# Patient Record
Sex: MALE | Race: WHITE | NOT HISPANIC OR LATINO | Employment: FULL TIME | URBAN - METROPOLITAN AREA
[De-identification: names, ages, dates, MRNs, and addresses within clinical notes are randomized per-mention and may not be internally consistent; named-entity substitution may affect disease eponyms.]

---

## 2022-07-12 ENCOUNTER — MEDICAL CORRESPONDENCE (OUTPATIENT)
Dept: HEALTH INFORMATION MANAGEMENT | Facility: CLINIC | Age: 67
End: 2022-07-12

## 2022-07-12 DIAGNOSIS — Z82.49 FAMILY HISTORY OF DISEASE OF AORTA: ICD-10-CM

## 2022-07-12 DIAGNOSIS — I71.9 AORTIC ANEURYSM (H): Primary | ICD-10-CM

## 2023-05-26 ENCOUNTER — HOSPITAL ENCOUNTER (OUTPATIENT)
Dept: CARDIOLOGY | Facility: CLINIC | Age: 68
Discharge: HOME | End: 2023-05-26
Attending: FAMILY MEDICINE | Admitting: FAMILY MEDICINE
Payer: COMMERCIAL

## 2023-05-26 DIAGNOSIS — Z82.49 FAMILY HISTORY OF DISEASE OF AORTA: ICD-10-CM

## 2023-05-26 DIAGNOSIS — I71.9 AORTIC ANEURYSM (H): Primary | ICD-10-CM

## 2023-05-26 LAB — LVEF ECHO: NORMAL

## 2023-05-26 PROCEDURE — 93306 TTE W/DOPPLER COMPLETE: CPT | Mod: 26 | Performed by: INTERNAL MEDICINE

## 2023-05-26 PROCEDURE — 999N000208 ECHOCARDIOGRAM COMPLETE

## 2023-05-26 PROCEDURE — 255N000002 HC RX 255 OP 636: Performed by: FAMILY MEDICINE

## 2023-05-26 RX ADMIN — HUMAN ALBUMIN MICROSPHERES AND PERFLUTREN 3 ML: 10; .22 INJECTION, SOLUTION INTRAVENOUS at 08:37

## 2024-05-24 ENCOUNTER — HOSPITAL ENCOUNTER (EMERGENCY)
Facility: CLINIC | Age: 69
Discharge: HOME | End: 2024-05-24
Attending: EMERGENCY MEDICINE | Admitting: EMERGENCY MEDICINE
Payer: COMMERCIAL

## 2024-05-24 ENCOUNTER — APPOINTMENT (OUTPATIENT)
Dept: GENERAL RADIOLOGY | Facility: CLINIC | Age: 69
End: 2024-05-24
Attending: EMERGENCY MEDICINE
Payer: COMMERCIAL

## 2024-05-24 VITALS
DIASTOLIC BLOOD PRESSURE: 72 MMHG | TEMPERATURE: 98.7 F | HEART RATE: 86 BPM | SYSTOLIC BLOOD PRESSURE: 132 MMHG | RESPIRATION RATE: 16 BRPM | OXYGEN SATURATION: 97 % | WEIGHT: 220 LBS

## 2024-05-24 DIAGNOSIS — M25.562 ACUTE PAIN OF LEFT KNEE: ICD-10-CM

## 2024-05-24 DIAGNOSIS — R55 VASOVAGAL NEAR SYNCOPE: ICD-10-CM

## 2024-05-24 PROCEDURE — 250N000013 HC RX MED GY IP 250 OP 250 PS 637

## 2024-05-24 PROCEDURE — 73562 X-RAY EXAM OF KNEE 3: CPT | Mod: LT

## 2024-05-24 PROCEDURE — 99284 EMERGENCY DEPT VISIT MOD MDM: CPT | Mod: 25

## 2024-05-24 PROCEDURE — 29505 APPLICATION LONG LEG SPLINT: CPT | Mod: LT

## 2024-05-24 RX ORDER — ACETAMINOPHEN 325 MG/1
975 TABLET ORAL ONCE
Status: COMPLETED | OUTPATIENT
Start: 2024-05-24 | End: 2024-05-24

## 2024-05-24 RX ORDER — IBUPROFEN 600 MG/1
600 TABLET, FILM COATED ORAL ONCE
Status: COMPLETED | OUTPATIENT
Start: 2024-05-24 | End: 2024-05-24

## 2024-05-24 RX ADMIN — IBUPROFEN 600 MG: 600 TABLET ORAL at 15:17

## 2024-05-24 RX ADMIN — ACETAMINOPHEN 975 MG: 325 TABLET, FILM COATED ORAL at 15:17

## 2024-05-24 ASSESSMENT — COLUMBIA-SUICIDE SEVERITY RATING SCALE - C-SSRS
6. HAVE YOU EVER DONE ANYTHING, STARTED TO DO ANYTHING, OR PREPARED TO DO ANYTHING TO END YOUR LIFE?: NO
1. IN THE PAST MONTH, HAVE YOU WISHED YOU WERE DEAD OR WISHED YOU COULD GO TO SLEEP AND NOT WAKE UP?: NO
2. HAVE YOU ACTUALLY HAD ANY THOUGHTS OF KILLING YOURSELF IN THE PAST MONTH?: NO

## 2024-05-24 ASSESSMENT — ACTIVITIES OF DAILY LIVING (ADL)
ADLS_ACUITY_SCORE: 35

## 2024-05-24 NOTE — ED TRIAGE NOTES
Hurt knee 2 nights ago, went to [facility] today to be seen and got faint. [facility] sent pt here for further eval.     Triage Assessment (Adult)       Row Name 05/24/24 4620          Triage Assessment    Airway WDL WDL        Respiratory WDL    Respiratory WDL WDL        Skin Circulation/Temperature WDL    Skin Circulation/Temperature WDL X  Left knee swelling in pain        Cardiac WDL    Cardiac WDL WDL        Peripheral/Neurovascular WDL    Peripheral Neurovascular WDL WDL        Cognitive/Neuro/Behavioral WDL    Cognitive/Neuro/Behavioral WDL WDL

## 2024-05-24 NOTE — ED PROVIDER NOTES
"  Emergency Department Note      History of Present Illness     Chief Complaint  Knee Pain    HPI  Sharan Munoz is a 68 year old male who presents to the ED today for evaluation of left knee pain.  2 days ago the patient was laying in bed when he flexed his left knee.  He immediately felt significant amount of pain after this.  He was able to ambulate the next morning but with some difficulty.  Reports that his knee feels \"wobbly\".  He has been taking Tylenol and ibuprofen for the pain.  At its worst the pain is rated a 9/10 in severity.  Today he had more difficulty ambulating.  Went to [facility] orthopedics, while there he had a syncopal episode.  States that he started to feel dizzy after trying to move his left leg.  Reports that there is a significant amount of pain which made it feel like he was going to \"pass out\". Denies past injury to his knee. No fevers, chills, headache. No chest pain, shortness of breath, or palpitations prior to feeling as if he was going to pass out.     Independent Historian  None    Review of External Notes  None  Past Medical History   Medical History and Problem List  Past Medical History:   Diagnosis Date    Gastro-oesophageal reflux disease     Hyperlipidaemia        Medications  HYDROcodone-acetaminophen (NORCO) 5-325 MG per tablet      Surgical History   Past Surgical History:   Procedure Laterality Date    ENT SURGERY      tonsils    HERNIORRHAPHY UMBILICAL  1/6/2014    Procedure: HERNIORRHAPHY UMBILICAL;  OPEN UMBILICAL HERNIA REPAIR WITH MESH ;  Surgeon: Daniel Rodgers MD;  Location: [facility]     Physical Exam   Patient Vitals for the past 24 hrs:   BP Temp Temp src Pulse Resp SpO2 Weight   05/24/24 1335 132/72 98.7  F (37.1  C) Temporal 86 16 97 % 99.8 kg (220 lb)     Physical Exam  General: Alert and oriented.    Head: Normocephalic.  External ears and nose normal.    Eyes: Pupils equal and round.  Normal tracking.    Pulmonary: Breath sounds clear. No wheezes, " rhonchi, or rales. Effort and rate normal.     Cardiovascular: Regular rate and rhythm. No murmurs, rubs or gallops.     MSK:    Significant pain with knee flexion and full extension. Moderate pain to palpation of quadriceps tendon and across the joint line. No pain to palpation of patellar tendon. No gross ligamentous laxity to varus/valgus stress. Unable to perform Lachman's or posterior drawer due to pain.  Normal movement at the hip and ankle.  Compartments soft, no pain w/passive ROM of ankle or great toe. Sensation to light touch intact. No erythema or warmth.     SKIN:  Warm and dry with strong DP, PT pulse and normal capillary refill. No rashes or crepitance.    NEURO:  Normal sensation throughout the foot and ankle. Normal strength of plantar and dorsiflexion in ankle and toes.    PSYCH:  Normal affect    Diagnostics   Lab Results   Labs Ordered and Resulted from Time of ED Arrival to Time of ED Departure - No data to display    Imaging  XR Knee Left 3 Views   Final Result   IMPRESSION: Mild medial and lateral compartment osteoarthrosis.   Moderate to severe patellofemoral compartment osteoarthrosis. Mild   lateral subluxation and tilt of the patella. Moderate effusion. No   evidence of fracture or calcified intra-articular body.      LIVE MCARTHUR MD            SYSTEM ID:  [id number]        Independent Interpretation  None  ED Course    Medications Administered  Medications   ibuprofen (ADVIL/MOTRIN) tablet 600 mg (600 mg Oral $Given 5/24/24 1517)   acetaminophen (TYLENOL) tablet 975 mg (975 mg Oral $Given 5/24/24 1517)       Procedures  Procedures     Discussion of Management  None    Social Determinants of Health adding to complexity of care  None    ED Course     Medical Decision Making / Diagnosis   CMS Diagnoses: None    MIPS     None    [facility]  Sharan Munoz is a 68 year old male who presented to the ED for evaluation of left knee pain for the past 2 days after flexing it while laying in bed  along with a presyncopal episode today. See HPI for further details. Patient's near syncope occurred after moving his painful leg while standing consistent with vasovagal syncope. History is not consistent with aortic stenosis, tachyarrhythmia, ACS, or stroke as the cause of near syncope. Patient was back to baseline minutes after the episode and had no recurrence here in the ED.    The patient's knee had a significant amount of swelling on exam. Physical exam is limited due to pain. XR of the left knee with evidence of arthritic changes, no acute fracture. Revealed moderate subluxation of the patella with an effusion. Suspect the patient's cause of knee pain and instability may be due to a ligamentous injury. Patient request tylenol and ibuprofen for his pain which was given. Instructed the patient to follow up with [facility] for further management and workup of his knee pain. The patient was placed in a knee immobilizer and given crutches. Instructed to use ibuprofen and tylenol for pain control at home. Patient is discharged home in stable condition.  Return precautions discussed. All questions answered.     Disposition  The patient was discharged.     ICD-10 Codes:    ICD-10-CM    1. Acute pain of left knee  M25.562       2. Vasovagal near syncope  R55          Discharge Medications  Discharge Medication List as of 5/24/2024  3:52 PM        Joana Oneill PA-C

## 2024-05-24 NOTE — DISCHARGE INSTRUCTIONS
Please follow-up with [facility] orthopedics as soon as possible, they will be able to diagnose and treat the cause of your left knee pain.  Please return to the emergency department if you have chest pain, shortness of breath, dizziness, fainting.

## 2024-08-14 ENCOUNTER — LAB REQUISITION (OUTPATIENT)
Dept: LAB | Facility: CLINIC | Age: 69
End: 2024-08-14
Payer: COMMERCIAL

## 2024-08-14 DIAGNOSIS — M25.562 PAIN IN LEFT KNEE: ICD-10-CM

## 2024-08-14 LAB
APPEARANCE FLD: ABNORMAL
BACTERIA SPEC CULT: NORMAL
CELL COUNT BODY FLUID SOURCE: ABNORMAL
COLOR FLD: ABNORMAL
CRYSTALS SNV MICRO: ABNORMAL
GRAM STAIN RESULT: NORMAL
GRAM STAIN RESULT: NORMAL
LYMPHOCYTES NFR FLD MANUAL: 3 %
MONOS+MACROS NFR FLD MANUAL: 9 %
NEUTS BAND NFR FLD MANUAL: 88 %
WBC # FLD AUTO: ABNORMAL /UL

## 2024-08-14 PROCEDURE — 87070 CULTURE OTHR SPECIMN AEROBIC: CPT | Mod: ORL | Performed by: ORTHOPAEDIC SURGERY

## 2024-08-14 PROCEDURE — 87205 SMEAR GRAM STAIN: CPT | Mod: ORL | Performed by: ORTHOPAEDIC SURGERY

## 2024-08-14 PROCEDURE — 89060 EXAM SYNOVIAL FLUID CRYSTALS: CPT | Mod: ORL | Performed by: ORTHOPAEDIC SURGERY

## 2024-08-14 PROCEDURE — 87075 CULTR BACTERIA EXCEPT BLOOD: CPT | Mod: ORL | Performed by: ORTHOPAEDIC SURGERY

## 2024-08-14 PROCEDURE — 89051 BODY FLUID CELL COUNT: CPT | Mod: ORL | Performed by: ORTHOPAEDIC SURGERY

## 2024-08-19 LAB — BACTERIA SNV CULT: NO GROWTH

## 2024-08-28 LAB — BACTERIA SNV CULT: NORMAL
